# Patient Record
Sex: FEMALE | Race: OTHER
[De-identification: names, ages, dates, MRNs, and addresses within clinical notes are randomized per-mention and may not be internally consistent; named-entity substitution may affect disease eponyms.]

---

## 2018-06-22 ENCOUNTER — HOSPITAL ENCOUNTER (EMERGENCY)
Dept: HOSPITAL 8 - ED | Age: 53
Discharge: HOME | End: 2018-06-22
Payer: SELF-PAY

## 2018-06-22 VITALS — DIASTOLIC BLOOD PRESSURE: 74 MMHG | SYSTOLIC BLOOD PRESSURE: 142 MMHG

## 2018-06-22 VITALS — HEIGHT: 62 IN | WEIGHT: 142.64 LBS | BODY MASS INDEX: 26.25 KG/M2

## 2018-06-22 DIAGNOSIS — E11.9: ICD-10-CM

## 2018-06-22 DIAGNOSIS — L73.9: ICD-10-CM

## 2018-06-22 DIAGNOSIS — N76.4: Primary | ICD-10-CM

## 2018-06-22 LAB
ACETONE, SERUM: (no result) MG/DL
ANION GAP SERPL CALC-SCNC: 9 MMOL/L (ref 5–15)
BASOPHILS # BLD AUTO: 0.1 X10^3/UL (ref 0–0.1)
BASOPHILS NFR BLD AUTO: 1 % (ref 0–1)
CALCIUM SERPL-MCNC: 8.2 MG/DL (ref 8.5–10.1)
CHLORIDE SERPL-SCNC: 105 MMOL/L (ref 98–107)
CREAT SERPL-MCNC: 0.63 MG/DL (ref 0.55–1.02)
EOSINOPHIL # BLD AUTO: 0.1 X10^3/UL (ref 0–0.4)
EOSINOPHIL NFR BLD AUTO: 1 % (ref 1–7)
ERYTHROCYTE [DISTWIDTH] IN BLOOD BY AUTOMATED COUNT: 14 % (ref 9.6–15.2)
LYMPHOCYTES # BLD AUTO: 1.6 X10^3/UL (ref 1–3.4)
LYMPHOCYTES NFR BLD AUTO: 17 % (ref 22–44)
MCH RBC QN AUTO: 24.8 PG (ref 27–34.8)
MCHC RBC AUTO-ENTMCNC: 33.1 G/DL (ref 32.4–35.8)
MCV RBC AUTO: 75.1 FL (ref 80–100)
MD: NO
MONOCYTES # BLD AUTO: 0.57 X10^3/UL (ref 0.2–0.8)
MONOCYTES NFR BLD AUTO: 6 % (ref 2–9)
NEUTROPHILS # BLD AUTO: 6.87 X10^3/UL (ref 1.8–6.8)
NEUTROPHILS NFR BLD AUTO: 74 % (ref 42–75)
PH BLDV: 7.41 PH (ref 7.32–7.42)
PLATELET # BLD AUTO: 413 X10^3/UL (ref 130–400)
PMV BLD AUTO: 8.9 FL (ref 7.4–10.4)
RBC # BLD AUTO: 4.7 X10^6/UL (ref 3.82–5.3)

## 2018-06-22 PROCEDURE — 82010 KETONE BODYS QUAN: CPT

## 2018-06-22 PROCEDURE — 82962 GLUCOSE BLOOD TEST: CPT

## 2018-06-22 PROCEDURE — 82803 BLOOD GASES ANY COMBINATION: CPT

## 2018-06-22 PROCEDURE — 99284 EMERGENCY DEPT VISIT MOD MDM: CPT

## 2018-06-22 PROCEDURE — 56405 I&D VULVA/PERINEAL ABSCESS: CPT

## 2018-06-22 PROCEDURE — 85025 COMPLETE CBC W/AUTO DIFF WBC: CPT

## 2018-06-22 PROCEDURE — 36415 COLL VENOUS BLD VENIPUNCTURE: CPT

## 2018-06-22 PROCEDURE — 80048 BASIC METABOLIC PNL TOTAL CA: CPT

## 2018-06-24 ENCOUNTER — HOSPITAL ENCOUNTER (EMERGENCY)
Dept: HOSPITAL 8 - ED | Age: 53
Discharge: HOME | End: 2018-06-24
Payer: SELF-PAY

## 2018-06-24 VITALS — DIASTOLIC BLOOD PRESSURE: 73 MMHG | SYSTOLIC BLOOD PRESSURE: 110 MMHG

## 2018-06-24 VITALS — BODY MASS INDEX: 25.84 KG/M2 | WEIGHT: 140.43 LBS | HEIGHT: 62 IN

## 2018-06-24 DIAGNOSIS — L02.611: Primary | ICD-10-CM

## 2018-06-24 PROCEDURE — 99281 EMR DPT VST MAYX REQ PHY/QHP: CPT

## 2018-12-11 ENCOUNTER — HOSPITAL ENCOUNTER (OUTPATIENT)
Dept: RADIOLOGY | Facility: MEDICAL CENTER | Age: 53
End: 2018-12-11
Attending: PHYSICIAN ASSISTANT

## 2018-12-11 DIAGNOSIS — N93.8 DUB (DYSFUNCTIONAL UTERINE BLEEDING): ICD-10-CM

## 2018-12-20 ENCOUNTER — TELEPHONE (OUTPATIENT)
Dept: SCHEDULING | Facility: IMAGING CENTER | Age: 53
End: 2018-12-20

## 2019-01-21 ENCOUNTER — TELEPHONE (OUTPATIENT)
Dept: OBGYN | Facility: CLINIC | Age: 54
End: 2019-01-21

## 2019-01-21 NOTE — TELEPHONE ENCOUNTER
Pt called to verify her appt.  .  Unable to contact pt, msg left to call back.  msg left w/appt info.

## 2019-01-22 ENCOUNTER — GYNECOLOGY VISIT (OUTPATIENT)
Dept: OBGYN | Facility: CLINIC | Age: 54
End: 2019-01-22
Payer: COMMERCIAL

## 2019-01-22 VITALS — DIASTOLIC BLOOD PRESSURE: 60 MMHG | BODY MASS INDEX: 27.67 KG/M2 | WEIGHT: 137 LBS | SYSTOLIC BLOOD PRESSURE: 102 MMHG

## 2019-01-22 DIAGNOSIS — N95.0 POSTMENOPAUSAL BLEEDING: ICD-10-CM

## 2019-01-22 DIAGNOSIS — N92.1 MENOMETRORRHAGIA: ICD-10-CM

## 2019-01-22 PROCEDURE — 99203 OFFICE O/P NEW LOW 30 MIN: CPT | Performed by: OBSTETRICS & GYNECOLOGY

## 2019-01-22 NOTE — PROGRESS NOTES
Chief complaint; new patient referred from [facility]    Linda Diop is a 53 y.o.  AB 1 BTL for birth control.  Who presents complaining of heavy irregular menstrual cycles lasting 22-34 days she has not gone through menopause.  Ultrasound ordered from [facility] shows probably small fibroids measuring less than 2 cm in diameter otherwise normal.  See results in results section  Patient states she did have an abnormal Pap smear and colposcopy was performed but no records are available..    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 at [facility]   • LARYNGOSCOPY  9/3/2013    Performed by Ronnie Barreto M.D. at [facility]   • VOCAL CORD POLYP EXCISION  9/3/2013    Performed by Ronnie Barreto M.D. at [facility]   • TUBAL LIGATION       Allergies-Patient has no known allergies.  Medications-  Current Outpatient Prescriptions on File Prior to Visit   Medication Sig Dispense Refill   • metformin (GLUCOPHAGE) 500 MG TABS Take 500 mg by mouth 2 times a day, with meals. Indications: Type 2 Diabetes       No current facility-administered medications on file prior to visit.      Social history-  Social History     Social History   • Marital status: Legally      Spouse name: N/A   • Number of children: N/A   • Years of education: N/A     Occupational History   • Not on file.     Social History Main Topics   • Smoking status: Never Smoker   • Smokeless tobacco: Never Used   • Alcohol use No      Comment: occ   • Drug use: No   • Sexual activity: Yes     Partners: Male      Other Topics Concern   • Not on file     Social History Narrative   • No narrative on file     Past Family History-no history of breast or ovarian cancer    Physical examination;  Alert and oriented x3  General a thin well-developed well-nourished female in no apparent distress  Vitals:    01/22/19 1448   BP: 102/60   Weight: 62.1 kg (137 lb)     Skin is warm and dry  Neck-supple  HEENT-head-atraumatic, normocephalic, EOMI, PERRLA  Cardiovascular-regular rate and rhythm, normal S1-S2, no murmurs or gallops  Lungs-clear to auscultation bilaterally  Back-negative CVA tenderness  Abdomen-nondistended positive bowel sounds soft nontender no masses or hepatosplenomegaly  Pelvic examination;  External genitalia-no visible lesions   Vagina-no blood or discharge  Cervix-no gross lesions-stenotic cervical os  Uterus-normal size and shape, nontender  Adnexa without mass or tenderness  Extremities without cyanosis clubbing or edema  Neurologic exam grossly intact    Impression;  Menometrorrhagia  Postmenopausal bleeding    Plan;  Needs endometrial biopsy and referral placed  Patient needs to sign consent form for medical records release from [facility] to see if colposcopy was performed and results.  Will need to dilate cervix to perform endometrial biopsy.  Discussed various etiologies for postmenopausal bleeding including uterine fibroids endometrial hyperplasia or endometrial carcinoma.        30  Minutes spent with the patient in face-to-face contact, greater than 50% of the time spent on counseling and coordination of care. All questions answered in detail.

## 2019-01-22 NOTE — NON-PROVIDER
Pt is here for a uterine mass.  She was diagnosed at the [facility].  Last pap 12/18  And was abnormal

## 2019-03-06 ENCOUNTER — TELEPHONE (OUTPATIENT)
Dept: OBGYN | Facility: CLINIC | Age: 54
End: 2019-03-06

## 2019-03-06 NOTE — TELEPHONE ENCOUNTER
Pt's daughter called asking to get information on procedure and the reason for it. Adv Trotter they need to do that procedure to rule out any abnormalities and procedure explained to Trotter., she voiced understanding and had no further questions or concerns

## 2022-02-25 ENCOUNTER — APPOINTMENT (OUTPATIENT)
Dept: RADIOLOGY | Facility: MEDICAL CENTER | Age: 57
End: 2022-02-25
Attending: EMERGENCY MEDICINE

## 2022-02-25 ENCOUNTER — HOSPITAL ENCOUNTER (EMERGENCY)
Facility: MEDICAL CENTER | Age: 57
End: 2022-02-25
Attending: EMERGENCY MEDICINE

## 2022-02-25 VITALS
WEIGHT: 133.16 LBS | SYSTOLIC BLOOD PRESSURE: 136 MMHG | HEIGHT: 58 IN | HEART RATE: 88 BPM | OXYGEN SATURATION: 97 % | TEMPERATURE: 98.4 F | BODY MASS INDEX: 27.95 KG/M2 | DIASTOLIC BLOOD PRESSURE: 71 MMHG | RESPIRATION RATE: 20 BRPM

## 2022-02-25 DIAGNOSIS — R42 LIGHTHEADEDNESS: ICD-10-CM

## 2022-02-25 DIAGNOSIS — R06.09 DOE (DYSPNEA ON EXERTION): ICD-10-CM

## 2022-02-25 DIAGNOSIS — J06.9 UPPER RESPIRATORY TRACT INFECTION, UNSPECIFIED TYPE: ICD-10-CM

## 2022-02-25 DIAGNOSIS — R07.9 CHEST PAIN, UNSPECIFIED TYPE: ICD-10-CM

## 2022-02-25 LAB
ALBUMIN SERPL BCP-MCNC: 4 G/DL (ref 3.2–4.9)
ALBUMIN/GLOB SERPL: 1.3 G/DL
ALP SERPL-CCNC: 84 U/L (ref 30–99)
ALT SERPL-CCNC: 23 U/L (ref 2–50)
ANION GAP SERPL CALC-SCNC: 14 MMOL/L (ref 7–16)
AST SERPL-CCNC: 22 U/L (ref 12–45)
BASOPHILS # BLD AUTO: 1.2 % (ref 0–1.8)
BASOPHILS # BLD: 0.07 K/UL (ref 0–0.12)
BILIRUB SERPL-MCNC: 0.4 MG/DL (ref 0.1–1.5)
BUN SERPL-MCNC: 10 MG/DL (ref 8–22)
CALCIUM SERPL-MCNC: 8.8 MG/DL (ref 8.5–10.5)
CHLORIDE SERPL-SCNC: 100 MMOL/L (ref 96–112)
CO2 SERPL-SCNC: 23 MMOL/L (ref 20–33)
CREAT SERPL-MCNC: 0.34 MG/DL (ref 0.5–1.4)
EKG IMPRESSION: NORMAL
EOSINOPHIL # BLD AUTO: 0.59 K/UL (ref 0–0.51)
EOSINOPHIL NFR BLD: 10.1 % (ref 0–6.9)
ERYTHROCYTE [DISTWIDTH] IN BLOOD BY AUTOMATED COUNT: 40.4 FL (ref 35.9–50)
GLOBULIN SER CALC-MCNC: 3.1 G/DL (ref 1.9–3.5)
GLUCOSE SERPL-MCNC: 321 MG/DL (ref 65–99)
HCT VFR BLD AUTO: 41.9 % (ref 37–47)
HGB BLD-MCNC: 14.4 G/DL (ref 12–16)
IMM GRANULOCYTES # BLD AUTO: 0.03 K/UL (ref 0–0.11)
IMM GRANULOCYTES NFR BLD AUTO: 0.5 % (ref 0–0.9)
LYMPHOCYTES # BLD AUTO: 1.4 K/UL (ref 1–4.8)
LYMPHOCYTES NFR BLD: 24.1 % (ref 22–41)
MCH RBC QN AUTO: 29.3 PG (ref 27–33)
MCHC RBC AUTO-ENTMCNC: 34.4 G/DL (ref 33.6–35)
MCV RBC AUTO: 85.2 FL (ref 81.4–97.8)
MONOCYTES # BLD AUTO: 0.71 K/UL (ref 0–0.85)
MONOCYTES NFR BLD AUTO: 12.2 % (ref 0–13.4)
NEUTROPHILS # BLD AUTO: 3.02 K/UL (ref 2–7.15)
NEUTROPHILS NFR BLD: 51.9 % (ref 44–72)
NRBC # BLD AUTO: 0 K/UL
NRBC BLD-RTO: 0 /100 WBC
NT-PROBNP SERPL IA-MCNC: 49 PG/ML (ref 0–125)
PLATELET # BLD AUTO: 286 K/UL (ref 164–446)
PMV BLD AUTO: 10.7 FL (ref 9–12.9)
POTASSIUM SERPL-SCNC: 4.1 MMOL/L (ref 3.6–5.5)
PROT SERPL-MCNC: 7.1 G/DL (ref 6–8.2)
RBC # BLD AUTO: 4.92 M/UL (ref 4.2–5.4)
SARS-COV-2 RNA RESP QL NAA+PROBE: NOTDETECTED
SODIUM SERPL-SCNC: 137 MMOL/L (ref 135–145)
SPECIMEN SOURCE: NORMAL
TROPONIN T SERPL-MCNC: 8 NG/L (ref 6–19)
WBC # BLD AUTO: 5.8 K/UL (ref 4.8–10.8)

## 2022-02-25 PROCEDURE — 84484 ASSAY OF TROPONIN QUANT: CPT

## 2022-02-25 PROCEDURE — 85025 COMPLETE CBC W/AUTO DIFF WBC: CPT

## 2022-02-25 PROCEDURE — 94760 N-INVAS EAR/PLS OXIMETRY 1: CPT

## 2022-02-25 PROCEDURE — 99285 EMERGENCY DEPT VISIT HI MDM: CPT

## 2022-02-25 PROCEDURE — 96374 THER/PROPH/DIAG INJ IV PUSH: CPT

## 2022-02-25 PROCEDURE — 80053 COMPREHEN METABOLIC PANEL: CPT

## 2022-02-25 PROCEDURE — U0003 INFECTIOUS AGENT DETECTION BY NUCLEIC ACID (DNA OR RNA); SEVERE ACUTE RESPIRATORY SYNDROME CORONAVIRUS 2 (SARS-COV-2) (CORONAVIRUS DISEASE [COVID-19]), AMPLIFIED PROBE TECHNIQUE, MAKING USE OF HIGH THROUGHPUT TECHNOLOGIES AS DESCRIBED BY CMS-2020-01-R: HCPCS

## 2022-02-25 PROCEDURE — 71045 X-RAY EXAM CHEST 1 VIEW: CPT

## 2022-02-25 PROCEDURE — 83880 ASSAY OF NATRIURETIC PEPTIDE: CPT

## 2022-02-25 PROCEDURE — 93005 ELECTROCARDIOGRAM TRACING: CPT

## 2022-02-25 PROCEDURE — 36415 COLL VENOUS BLD VENIPUNCTURE: CPT

## 2022-02-25 PROCEDURE — U0005 INFEC AGEN DETEC AMPLI PROBE: HCPCS

## 2022-02-25 PROCEDURE — 700111 HCHG RX REV CODE 636 W/ 250 OVERRIDE (IP): Performed by: EMERGENCY MEDICINE

## 2022-02-25 PROCEDURE — 93005 ELECTROCARDIOGRAM TRACING: CPT | Performed by: EMERGENCY MEDICINE

## 2022-02-25 RX ORDER — KETOROLAC TROMETHAMINE 30 MG/ML
30 INJECTION, SOLUTION INTRAMUSCULAR; INTRAVENOUS ONCE
Status: COMPLETED | OUTPATIENT
Start: 2022-02-25 | End: 2022-02-25

## 2022-02-25 RX ADMIN — KETOROLAC TROMETHAMINE 30 MG: 30 INJECTION, SOLUTION INTRAMUSCULAR at 11:51

## 2022-02-25 NOTE — ED PROVIDER NOTES
"ED Provider Note    Scribed for Aftab Walters M.D. by Rigo Rivas. 2/25/2022  10:03 AM    Primary care provider: Pcp Pt States None  Means of arrival: Walk in   History obtained from: Patient   History limited by: None     CHIEF COMPLAINT  Chief Complaint   Patient presents with    Dizziness     Dizziness that worsens with ambulation and walking up the stairs    Chest Pain     L sided Cpx3 days that is a \"constant heavy weight\"       HPI  Linda Diop is a 56 y.o. female who presents to the Emergency Department for evaluation of worsening constant left sided chest pain onset 3 days ago. Patient reports a few days prior to having chest pains, she has URI symptoms including cough, congestion, and rhinorrhea. Shortly after those symptoms began, she started having left sided chest pain which was non-radiating and pressure like in sensation. She denies exacerbation of chest pain with ambulation or exertion. She states that this is the first time she has been having such symptoms. She presents associated symptoms of dizziness that worsens with exertion, generalized weakness, bilateral leg pain, shortness of breath with exertion, and numbness to bilateral legs. She adds that her right leg hurts more than her left. Denies numbness or tingling to her arms, edema in bilateral lower extremities, nausea, emesis, diarrhea, fever, loss of appetite, decrease in PO intake, or chills. Patient reports that when she ambulates, her leg pain feels better, but adds that her leg pain worsens at night. She has not taken Tylenol or Ibuprofen at home for alleviation. Patient currently lives with her son at home. Patient is vaccinated for COVID x2, but states that she was diagnosed with COVID in January 2022. She denies smoking or drinking alcohol. She denies history of MI, Hypertension, DVT or PE. Patient is not on any hormones. Patient has additional history of Diabetes and is compliant with her Metformin dosages. " "    REVIEW OF SYSTEMS  Pertinent positives include resolved cough, resolved rhinorrhea, resolved congestion, dizziness, generalized weakness, left sided chest pain, bilateral leg pain, shortness of breath, and numbness to bilateral legs.. Pertinent negatives include no edema in bilateral lower extremities numbness or tingling to her arms, nausea, emesis, diarrhea, fever, loss of appetite, decrease in PO intake or chills.  All other systems reviewed and negative.    PAST MEDICAL HISTORY   has a past medical history of Cancer (HCC) (9/2013) and DIABETES MELLITUS (2010).    SURGICAL HISTORY   has a past surgical history that includes tubal ligation (2001); laryngoscopy (9/3/2013); vocal cord polyp excision (9/3/2013); and pterygium excision (12/10/2013).    SOCIAL HISTORY  Social History     Tobacco Use    Smoking status: Never Smoker    Smokeless tobacco: Never Used   Substance Use Topics    Alcohol use: No     Comment: occ    Drug use: No      Social History     Substance and Sexual Activity   Drug Use No       FAMILY HISTORY  Family History   Problem Relation Age of Onset    Diabetes Mother     Hypertension Mother     Heart Disease Mother     Diabetes Father     Heart Disease Father     Diabetes Sister     Diabetes Brother        CURRENT MEDICATIONS  Current Outpatient Medications   Medication Instructions    metFORMIN (GLUCOPHAGE) 500 mg, 2 TIMES DAILY WITH MEALS        ALLERGIES  No Known Allergies    PHYSICAL EXAM  VITAL SIGNS: /83   Pulse 92   Temp 36.3 °C (97.3 °F) (Temporal)   Resp 16   Ht 1.473 m (4' 10\")   Wt 60.4 kg (133 lb 2.5 oz)   SpO2 98%   BMI 27.83 kg/m²     Constitutional: Well developed, Well nourished, no acute distress, Non-toxic appearance.   HENT: Normocephalic, Atraumatic, Bilateral external ears normal, Oropharynx moist, No oral exudates.   Eyes: PERRLA, EOMI, Conjunctiva normal, No discharge.   Neck: No tenderness, Supple, No stridor.   Lymphatic: No lymphadenopathy noted. "   Cardiovascular: Normal heart rate, Normal rhythm.   Thorax & Lungs: Clear to auscultation bilaterally, No respiratory distress, No wheezing, No crackles. No positional chest pain with deep breaths   Abdomen: Soft, No tenderness, No masses, No pulsatile masses.   Skin: Warm, Dry, No erythema, No rash.   Extremities:, No edema No cyanosis. 1+ dorsalis pedis pulses. Bilateral feet are cold  Musculoskeletal: No tenderness to palpation or major deformities noted.  Intact distal pulses  Neurologic: Awake, alert. Cranial nerves 2-11 intact, muscle strength 5/5 in bilateral upper and lower extremities   Psychiatric: Affect normal, Judgment normal, Mood normal.     LABS  Results for orders placed or performed during the hospital encounter of 02/25/22   CBC with Differential   Result Value Ref Range    WBC 5.8 4.8 - 10.8 K/uL    RBC 4.92 4.20 - 5.40 M/uL    Hemoglobin 14.4 12.0 - 16.0 g/dL    Hematocrit 41.9 37.0 - 47.0 %    MCV 85.2 81.4 - 97.8 fL    MCH 29.3 27.0 - 33.0 pg    MCHC 34.4 33.6 - 35.0 g/dL    RDW 40.4 35.9 - 50.0 fL    Platelet Count 286 164 - 446 K/uL    MPV 10.7 9.0 - 12.9 fL    Neutrophils-Polys 51.90 44.00 - 72.00 %    Lymphocytes 24.10 22.00 - 41.00 %    Monocytes 12.20 0.00 - 13.40 %    Eosinophils 10.10 (H) 0.00 - 6.90 %    Basophils 1.20 0.00 - 1.80 %    Immature Granulocytes 0.50 0.00 - 0.90 %    Nucleated RBC 0.00 /100 WBC    Neutrophils (Absolute) 3.02 2.00 - 7.15 K/uL    Lymphs (Absolute) 1.40 1.00 - 4.80 K/uL    Monos (Absolute) 0.71 0.00 - 0.85 K/uL    Eos (Absolute) 0.59 (H) 0.00 - 0.51 K/uL    Baso (Absolute) 0.07 0.00 - 0.12 K/uL    Immature Granulocytes (abs) 0.03 0.00 - 0.11 K/uL    NRBC (Absolute) 0.00 K/uL   Complete Metabolic Panel (CMP)   Result Value Ref Range    Sodium 137 135 - 145 mmol/L    Potassium 4.1 3.6 - 5.5 mmol/L    Chloride 100 96 - 112 mmol/L    Co2 23 20 - 33 mmol/L    Anion Gap 14.0 7.0 - 16.0    Glucose 321 (H) 65 - 99 mg/dL    Bun 10 8 - 22 mg/dL    Creatinine 0.34 (L)  0.50 - 1.40 mg/dL    Calcium 8.8 8.5 - 10.5 mg/dL    AST(SGOT) 22 12 - 45 U/L    ALT(SGPT) 23 2 - 50 U/L    Alkaline Phosphatase 84 30 - 99 U/L    Total Bilirubin 0.4 0.1 - 1.5 mg/dL    Albumin 4.0 3.2 - 4.9 g/dL    Total Protein 7.1 6.0 - 8.2 g/dL    Globulin 3.1 1.9 - 3.5 g/dL    A-G Ratio 1.3 g/dL   Troponin STAT   Result Value Ref Range    Troponin T 8 6 - 19 ng/L   proBrain Natriuretic Peptide, NT   Result Value Ref Range    NT-proBNP 49 0 - 125 pg/mL   SARS-CoV-2, PCR (24 Hour In-House) Collect NP swab in VTM    Specimen: Respirate   Result Value Ref Range    SARS-CoV-2 Source NP Swab     SARS-CoV-2 by PCR NotDetected    ESTIMATED GFR   Result Value Ref Range    GFR If African American >60 >60 mL/min/1.73 m 2    GFR If Non African American >60 >60 mL/min/1.73 m 2   EKG (NOW)   Result Value Ref Range    Report       [facility] Emergency Dept.    Test Date:  2022  Pt Name:    DIMAS ESPANA             Department: ER  MRN:        [id number]                      Room:  Gender:     Female                       Technician: [id number]  :        [id number]                   Requested By:ER TRIAGE PROTOCOL  Order #:    [id number]                    Reading MD: GÉNESIS HARRISON MD    Measurements  Intervals                                Axis  Rate:       88                           P:          66  DE:         164                          QRS:        37  QRSD:       86                           T:          10  QT:         392  QTc:        475    Interpretive Statements  SINUS RHYTHM  BORDERLINE LOW VOLTAGE IN FRONTAL LEADS  BORDERLINE R WAVE PROGRESSION, ANTERIOR LEADS  No previous ECG available for comparison  Electronically Signed On 2022 10:21:09 PST by GÉNESIS HARRISON MD       All labs reviewed by me.  12 lead EKG interpreted by me as shown above.     RADIOLOGY  DX-CHEST-PORTABLE (1 VIEW)   Final Result      No evidence of acute cardiopulmonary process.        The radiologist's  "interpretation of all radiological studies have been reviewed by me.      COURSE & MEDICAL DECISION MAKING  Pertinent Labs & Imaging studies reviewed. (See chart for details)      10:03 AM  Patient evaluated at bedside. Ordered DX-chest, COV 2, CBC with diff., CMP, BNP, troponin, and EKG for further evaluation.  Patient will be treated with Toradol 30 mg. Differential diagnoses include but not limited to: ACS, PE, Diabetic Neuropathy, Long COVID, or URI. Discussed with patient about administering basic labs and imaging for further evaluation.  Informed patient about medication administration for pain control. Patient verbalizes understanding and agreement to this plan of care.     11:46 AM Patient was reevaluated at bedside. Discussed lab and radiology results with the patient and informed them that there is no evidence of heart attack, infection or pnemonia. I discussed with patient care of plan following discharge. Patient was given opportunity to ask questions at this time. Counseled patient on proper OTC medication dosages for pain control and relief. Patient verbalizes understanding and agrees to care of plan.  Patient will be discharged at this time. Patient will be discharged with a referral to Cardiology. Her vital signs prior to discharge: BP (!) 179/84   Pulse 86   Temp 36.3 °C (97.3 °F) (Temporal)   Resp 16   Ht 1.473 m (4' 10\")   Wt 60.4 kg (133 lb 2.5 oz)   LMP 12/08/2018   SpO2 97%   BMI 27.83 kg/m²     Decision Making:  Patient is coming in with left-sided chest chest pressure after URI type symptoms, the patient is also having dyspnea on exertion and lightheadedness.  Work-up here is unremarkable, the patient has minimal risk factors for ACS, heart score is 2.  I believe the patient can follow-up as an outpatient, have the patient return with worsening symptoms, Tylenol ibuprofen for her symptoms.    The patient will return for new or worsening symptoms and is stable at the time of " discharge.    The patient is referred to a primary physician for blood pressure management, diabetic screening, and for all other preventative health concerns.    DISPOSITION:  Patient will be discharged home in stable condition.    FOLLOW UP:  [facility], Emergency Dept  [address]  [facility] [address] [phone]    If symptoms worsen    [facility]  [address], [address]  [facility] [address] [phone]      FINAL IMPRESSION  1. Upper respiratory tract infection, unspecified type    2. Chest pain, unspecified type    3. KATHLEEN (dyspnea on exertion)    4. Lightheadedness        Rigo GRAY (Scribe), am scribing for, and in the presence of, Aftab Walters M.D..    Electronically signed by: Rigo Rivas (Scribe), 2/25/2022    IAftab M.D. personally performed the services described in this documentation, as scribed by Rigo Rivas in my presence, and it is both accurate and complete.    C    The note accurately reflects work and decisions made by me.  Aftab Walters M.D.  2/25/2022  5:47 PM

## 2022-02-25 NOTE — ED NOTES
Pt given d/c instructions and f/u info with verbal understanding.  VSS at discharge.  PIV d/c'd with tip intact.  Pt ambulatory from the ED w/ steady gait accompanied by family member.  All belongings in possession on discharge.  Pt escorted to the lobby by RN.

## 2022-02-25 NOTE — ED NOTES
Pt ambulated with a normal, steady gait to the room from the [facility]. Pt asked to change into a gown and placed on a cardiac monitor, pulse ox, and bp cuff.

## 2022-02-25 NOTE — ED NOTES
Med rec completed per patient at bedside.  Allergies reviewed with patient. NKDA.  Per patient, no vitamins/supplements and no recent over-the-counter medications.  No outpatient antibiotics in the last 30 days.  Patient's preferred pharmacy: [facility].

## 2022-02-25 NOTE — ED TRIAGE NOTES
"Chief Complaint   Patient presents with   • Dizziness     Dizziness that worsens with ambulation and walking up the stairs   • Chest Pain     L sided Cpx3 days that is a \"constant heavy weight\"       Pt walk in w/ her son. Pt aox4, speaking full sentences. No SOB. EKG obtained and protocol ordered.     /83   Pulse 92   Temp 36.3 °C (97.3 °F) (Temporal)   Resp 16   Ht 1.473 m (4' 10\")   Wt 60.4 kg (133 lb 2.5 oz)   SpO2 98%   BMI 27.83 kg/m²     "

## 2022-02-26 NOTE — RESULT ENCOUNTER NOTE
Patient presents with L sided flank pain since 0430. +nasuea and dizziness. Denies any urinary symptoms.    Released to [facility] With instructions

## 2023-12-11 ENCOUNTER — HOSPITAL ENCOUNTER (EMERGENCY)
Facility: MEDICAL CENTER | Age: 58
End: 2023-12-11
Attending: STUDENT IN AN ORGANIZED HEALTH CARE EDUCATION/TRAINING PROGRAM

## 2023-12-11 VITALS
WEIGHT: 133.16 LBS | DIASTOLIC BLOOD PRESSURE: 76 MMHG | TEMPERATURE: 97.3 F | OXYGEN SATURATION: 99 % | HEART RATE: 108 BPM | RESPIRATION RATE: 14 BRPM | SYSTOLIC BLOOD PRESSURE: 146 MMHG | BODY MASS INDEX: 27.83 KG/M2

## 2023-12-11 DIAGNOSIS — Z51.89 ENCOUNTER FOR WOUND RE-CHECK: ICD-10-CM

## 2023-12-11 PROCEDURE — 99282 EMERGENCY DEPT VISIT SF MDM: CPT

## 2023-12-11 ASSESSMENT — FIBROSIS 4 INDEX: FIB4 SCORE: 0.9

## 2023-12-12 NOTE — DISCHARGE INSTRUCTIONS
Take all the antibiotics until they are gone if you develop any increasing redness around your labia's, or spreading into your groin, increasing purulent drainage, symptoms do not improve in the next 48 hours return for recheck please take all antibiotics until they are gone and return with other concerns

## 2023-12-12 NOTE — ED TRIAGE NOTES
Chief Complaint   Patient presents with    Abscess     Patient has an abscess in right groin         Patient educated on triage process. Informed to notified ED staff of change in symptoms.     Vitals:    12/11/23 1919   BP: (!) 150/76   Pulse: (!) 109   Resp: 16   Temp: 36.1 °C (97 °F)   SpO2: 98%

## 2023-12-12 NOTE — ED PROVIDER NOTES
CHIEF COMPLAINT  Chief Complaint   Patient presents with    Abscess     Patient has an abscess in right groin        LIMITATION TO HISTORY   Select: None    HPI    Linda Diop is a 58 y.o. female who presents to the Emergency Department evaluation of an abscess in her right groin.  Patient stated that approximately 5 days ago she developed painful area of swelling, was seen by her PCP, who performed an incision and drainage and prescribed her doxycycline.  She did not start taking the doxycycline, states that since having the I&D performed she has had significant improvements in the area of induration and swelling though given the wound had not resolved she Beltran presented to the ER.  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    SURGICAL HISTORY  Past Surgical History:   Procedure Laterality Date    PTERYGIUM EXCISION  12/10/2013    Performed by Arnaud Suarez M.D. at SURGERY SAME DAY [facility] ORS    LARYNGOSCOPY  9/3/2013    Performed by Ronnie Barreto M.D. at SURGERY SAME DAY [facility] ORS    VOCAL CORD POLYP EXCISION  9/3/2013    Performed by Ronnie Barreto M.D. at SURGERY SAME DAY [facility] ORS    TUBAL LIGATION  2001         FAMILY HISTORY  Family History   Problem Relation Age of Onset    Diabetes Mother     Hypertension Mother     Heart Disease Mother     Diabetes Father     Heart Disease Father     Diabetes Sister     Diabetes Brother           SOCIAL HISTORY  Social History     Socioeconomic History    Marital status: Legally      Spouse name: Not on file    Number of  children: Not on file    Years of education: Not on file    Highest education level: Not on file   Occupational History    Not on file   Tobacco Use    Smoking status: Never    Smokeless tobacco: Never   Substance and Sexual Activity    Alcohol use: No     Comment: occ    Drug use: No    Sexual activity: Yes     Partners: Male   Other Topics Concern    Not on file   Social History Narrative    Not on file     Social Determinants of Health     Financial Resource Strain: Not on file   Food Insecurity: Not on file   Transportation Needs: Not on file   Physical Activity: Not on file   Stress: Not on file   Social Connections: Not on file   Intimate Partner Violence: Not on file   Housing Stability: Not on file         CURRENT MEDICATIONS  No current facility-administered medications on file prior to encounter.     Current Outpatient Medications on File Prior to Encounter   Medication Sig Dispense Refill    metformin (GLUCOPHAGE) 1000 MG tablet Take 1,000 mg by mouth 2 times a day. Indications: Type 2 Diabetes             ALLERGIES  No Known Allergies    PHYSICAL EXAM  VITAL SIGNS:BP (!) 150/76   Pulse (!) 109   Temp 36.1 °C (97 °F) (Temporal)   Resp 16   Wt 60.4 kg (133 lb 2.5 oz)   LMP 12/08/2018   SpO2 98%   BMI 27.83 kg/m²       VITALS - vital signs documented prior to this note have been reviewed and noted,  see EHR  GENERAL - awake and alert, no acute distress  HEENT - normocephalic, atraumatic, moist mucus membranes  CARDIOVASCULAR - regular rate and rhythm  PULMONARY - unlabored, no respiratory distress. No audible wheezing or  stridor.    : An external  exam was performed with female nurse chaperone RINA present in the room at all times.  She has a very small area of induration measuring approximately 1 x 0.5 cm a bedside ultrasound was performed and there was no residual abscess, there is no surrounding evidence of cellulitis, there is no erythema of the perineum, labia, no fluctuance or crepitus    NEUROLOGIC - mental status normal, speech fluid, cognition normal  MUSCULOSKELETAL -no obvious deformity or swelling  DERMATOLOGIC - warm and dry, no visible rashes  PSYCHIATRIC - normal affect, normal concentration      DIAGNOSTIC STUDIES / PROCEDURES    RADIOLOGY  I have independently interpreted the diagnostic imaging associated with this visit and am waiting the final reading from the radiologist.   My preliminary interpretation is as follows: No abscess    Radiologist interpretation:   No orders to display        COURSE & MEDICAL DECISION MAKING    ED COURSE:    ED Observation Status? no    INTERVENTIONS BY ME:  Medications - No data to display      Point of Care Ultrasound    ED POINT OF CARE ULTRASOUND: LIMITED SKIN/SOFT TISSUE, FOREIGN BODY IDENTIFICATION    Indication: Swelling and Pain  Procedure: A limited ultrasound of the patients skin and soft tissue was performed at the area of clinical concern as noted.     There was not evidence of a foreign body present.   There was evidence of cobblestoning of the soft tissues.   A localized fluid collection was not present.     Impression: Based on this limited bedside ultrasound, there was evidence of cellulitis, and a drainable fluid collection was not seen. There was not evidence of a foreign body. Further clinical interpretation or comments:     [facility]         This study is a limited ultrasound examination performed and interpreted to evaluate for limited conditions as outlined above. There may be other clinically important information contained in the images that is outside this scope. When clinically warranted, a comprehensive ultrasound through the appropriate department is considered.         @HTN/IDDM FOLLOW UP:  The patient has known hypertension and is being followed by their primary care doctor@    INITIAL ASSESSMENT, COURSE AND PLAN  Care Narrative: Patient presented for evaluation of pain and swelling near a  recent incision and drainage site in her right inguinal region.  On examination she has a 1 x 0.5 cm area of induration without surrounding erythema or fluctuance.  Patient denies any recent fevers does have an underlying history of diabetes though there is no erythema of the patient's perineum, external labia's, and no erythema surrounding the recent incision and drainage site.  In addition the patient reports that the area of swelling has significantly improved after the incision and drainage however she has not started taking the antibiotics that she was prescribed.  She just picked them up today.  Patient was prescribed doxycycline which I believe would be an appropriate antibiotic.  Was tachycardic initially in triage though this resolved without any intervention on my assessment her heart rate was 88.  A bedside ultrasound was performed, and there was no drainable fluid collection, was a small area cobblestoning near the recent I&D site may be residual inflammation.  On examination there is no evidence to suggest an underlying Annetta's, abscess or other necrotizing infection thus labs and imaging were deferred. She has not started her doxycycline, though does have it in her possession.  Recommend she start taking her doxycycline return for wound recheck if she develops any spreading erythema worsening pain purulent drainage or symptoms do not improve in the next 24 to 48 hours she did feel comfortable plan is discharged in stable condition           ADDITIONAL PROBLEM LIST    DISPOSITION AND DISCUSSIONS    Escalation of care considered, and ultimately not performed:blood analysis and diagnostic imaging    Barriers to care at this time, including but not limited    Decision tools and prescription drugs considered including, but not limited to: Antibiotics   .    FINAL DIAGNOSIS  1. Encounter for wound re-check             Electronically signed by: Yadiel West DO ,8:47 PM 12/11/23

## 2023-12-12 NOTE — ED NOTES
Discharge paperwork and teaching provided to patient regarding abscess and all questions answered. VSS, pain assessment stable. Provided information regarding home care, antibiotic prescription from PCP, follow up with PCP, and reasons to return to ED. Patient provided no Rx. Patient discharged to the care of self and ambulated with steady gait out of the ED with all belongings.

## 2023-12-12 NOTE — ED NOTES
Pt reports abscess started last Thursday as a reddened, sore are in the right groin. It became worse Friday, so she saw her PCP who performed an I&D and prescribed doxycycline. Pt did not  antibiotic Rx from pharmacy until today. Abscess has become more painful and red. Pt denies taking any medication for pain.

## 2024-08-09 ENCOUNTER — APPOINTMENT (OUTPATIENT)
Dept: RADIOLOGY | Facility: MEDICAL CENTER | Age: 59
End: 2024-08-09
Attending: EMERGENCY MEDICINE

## 2024-08-09 ENCOUNTER — HOSPITAL ENCOUNTER (EMERGENCY)
Facility: MEDICAL CENTER | Age: 59
End: 2024-08-09
Attending: EMERGENCY MEDICINE

## 2024-08-09 VITALS
DIASTOLIC BLOOD PRESSURE: 83 MMHG | SYSTOLIC BLOOD PRESSURE: 155 MMHG | TEMPERATURE: 98.8 F | WEIGHT: 114.64 LBS | BODY MASS INDEX: 23.96 KG/M2 | OXYGEN SATURATION: 98 % | HEART RATE: 110 BPM | RESPIRATION RATE: 18 BRPM

## 2024-08-09 DIAGNOSIS — B34.9 VIRAL SYNDROME: ICD-10-CM

## 2024-08-09 DIAGNOSIS — R73.9 HYPERGLYCEMIA: ICD-10-CM

## 2024-08-09 LAB
ALBUMIN SERPL BCP-MCNC: 3.2 G/DL (ref 3.2–4.9)
ALBUMIN/GLOB SERPL: 0.7 G/DL
ALP SERPL-CCNC: 147 U/L (ref 30–99)
ALT SERPL-CCNC: 20 U/L (ref 2–50)
ANION GAP SERPL CALC-SCNC: 11 MMOL/L (ref 7–16)
AST SERPL-CCNC: 9 U/L (ref 12–45)
BASOPHILS # BLD AUTO: 0.6 % (ref 0–1.8)
BASOPHILS # BLD: 0.06 K/UL (ref 0–0.12)
BILIRUB SERPL-MCNC: 0.5 MG/DL (ref 0.1–1.5)
BUN SERPL-MCNC: 40 MG/DL (ref 8–22)
CALCIUM ALBUM COR SERPL-MCNC: 9.8 MG/DL (ref 8.5–10.5)
CALCIUM SERPL-MCNC: 9.2 MG/DL (ref 8.5–10.5)
CHLORIDE SERPL-SCNC: 92 MMOL/L (ref 96–112)
CO2 SERPL-SCNC: 23 MMOL/L (ref 20–33)
CREAT SERPL-MCNC: 0.64 MG/DL (ref 0.5–1.4)
EKG IMPRESSION: NORMAL
EOSINOPHIL # BLD AUTO: 0.08 K/UL (ref 0–0.51)
EOSINOPHIL NFR BLD: 0.8 % (ref 0–6.9)
ERYTHROCYTE [DISTWIDTH] IN BLOOD BY AUTOMATED COUNT: 45.5 FL (ref 35.9–50)
FLUAV RNA SPEC QL NAA+PROBE: NEGATIVE
FLUBV RNA SPEC QL NAA+PROBE: NEGATIVE
GFR SERPLBLD CREATININE-BSD FMLA CKD-EPI: 103 ML/MIN/1.73 M 2
GLOBULIN SER CALC-MCNC: 4.8 G/DL (ref 1.9–3.5)
GLUCOSE BLD STRIP.AUTO-MCNC: 427 MG/DL (ref 65–99)
GLUCOSE BLD STRIP.AUTO-MCNC: 472 MG/DL (ref 65–99)
GLUCOSE BLD STRIP.AUTO-MCNC: 495 MG/DL (ref 65–99)
GLUCOSE SERPL-MCNC: 470 MG/DL (ref 65–99)
HCT VFR BLD AUTO: 41.2 % (ref 37–47)
HGB BLD-MCNC: 13.6 G/DL (ref 12–16)
IMM GRANULOCYTES # BLD AUTO: 0.07 K/UL (ref 0–0.11)
IMM GRANULOCYTES NFR BLD AUTO: 0.7 % (ref 0–0.9)
LYMPHOCYTES # BLD AUTO: 0.72 K/UL (ref 1–4.8)
LYMPHOCYTES NFR BLD: 6.9 % (ref 22–41)
MCH RBC QN AUTO: 27.4 PG (ref 27–33)
MCHC RBC AUTO-ENTMCNC: 33 G/DL (ref 32.2–35.5)
MCV RBC AUTO: 83.1 FL (ref 81.4–97.8)
MONOCYTES # BLD AUTO: 1 K/UL (ref 0–0.85)
MONOCYTES NFR BLD AUTO: 9.6 % (ref 0–13.4)
NEUTROPHILS # BLD AUTO: 8.46 K/UL (ref 1.82–7.42)
NEUTROPHILS NFR BLD: 81.4 % (ref 44–72)
NRBC # BLD AUTO: 0 K/UL
NRBC BLD-RTO: 0 /100 WBC (ref 0–0.2)
PLATELET # BLD AUTO: 210 K/UL (ref 164–446)
PMV BLD AUTO: 11.2 FL (ref 9–12.9)
POTASSIUM SERPL-SCNC: 4.7 MMOL/L (ref 3.6–5.5)
PROT SERPL-MCNC: 8 G/DL (ref 6–8.2)
RBC # BLD AUTO: 4.96 M/UL (ref 4.2–5.4)
RSV RNA SPEC QL NAA+PROBE: NEGATIVE
SARS-COV-2 RNA RESP QL NAA+PROBE: NOTDETECTED
SODIUM SERPL-SCNC: 126 MMOL/L (ref 135–145)
TROPONIN T SERPL-MCNC: 7 NG/L (ref 6–19)
WBC # BLD AUTO: 10.4 K/UL (ref 4.8–10.8)

## 2024-08-09 PROCEDURE — 0241U HCHG SARS-COV-2 COVID-19 NFCT DS RESP RNA 4 TRGT ED POC: CPT

## 2024-08-09 PROCEDURE — 96374 THER/PROPH/DIAG INJ IV PUSH: CPT

## 2024-08-09 PROCEDURE — 700111 HCHG RX REV CODE 636 W/ 250 OVERRIDE (IP): Performed by: EMERGENCY MEDICINE

## 2024-08-09 PROCEDURE — 82962 GLUCOSE BLOOD TEST: CPT

## 2024-08-09 PROCEDURE — 84484 ASSAY OF TROPONIN QUANT: CPT

## 2024-08-09 PROCEDURE — 80053 COMPREHEN METABOLIC PANEL: CPT

## 2024-08-09 PROCEDURE — 99284 EMERGENCY DEPT VISIT MOD MDM: CPT

## 2024-08-09 PROCEDURE — 85025 COMPLETE CBC W/AUTO DIFF WBC: CPT

## 2024-08-09 PROCEDURE — 71045 X-RAY EXAM CHEST 1 VIEW: CPT

## 2024-08-09 PROCEDURE — 93005 ELECTROCARDIOGRAM TRACING: CPT | Performed by: EMERGENCY MEDICINE

## 2024-08-09 PROCEDURE — 36415 COLL VENOUS BLD VENIPUNCTURE: CPT

## 2024-08-09 RX ADMIN — INSULIN HUMAN 5 UNITS: 100 INJECTION, SOLUTION PARENTERAL at 14:22

## 2024-08-09 NOTE — ED TRIAGE NOTES
Chief Complaint   Patient presents with    Flu Like Symptoms     PT reports body aches, R sided chest pain. nausea and congestion since Sunday.     H/O DM II and Cancer.  Covid testing and CP protocol initiated.

## 2024-08-09 NOTE — ED PROVIDER NOTES
ED Provider Note    CHIEF COMPLAINT  Chief Complaint   Patient presents with    Flu Like Symptoms     PT reports body aches, R sided chest pain. nausea and congestion since Sunday.       EXTERNAL RECORDS REVIEWED  Inpatient Notes prior ER note 12/11/2023 and the patient was here for recheck after abscess    HPI/ROS  LIMITATION TO HISTORY   Select: Language Ecuadorean,  Used   OUTSIDE HISTORIAN(S):  Family at bedside providing history and translation    Linda Diop is a 59 y.o. female who presents to the emergency department for feeling generally unwell for the last 5 to 6 days.  Diffuse body aches to include muscle and joint discomfort.  Also complaining of some chest discomfort especially under the right lower ribs.  Has had some nasal congestion and nausea.  No known sick contacts.  No recent travel.  Does have known history of diabetes with chronic hyperglycemia.  Family at bedside states that with prior attempts at more strict blood sugar control the patient had multiple recurrent lows which were ultimately deemed to be unsuitable and therefore there was additional allowance for higher averages.    PAST MEDICAL HISTORY   has a past medical history of Cancer (HCC) (9/2013) and DIABETES MELLITUS (2010).    SURGICAL HISTORY   has a past surgical history that includes tubal ligation (2001); laryngoscopy (9/3/2013); vocal cord polyp excision (9/3/2013); and pterygium excision (12/10/2013).    FAMILY HISTORY  Family History   Problem Relation Age of Onset    Diabetes Mother     Hypertension Mother     Heart Disease Mother     Diabetes Father     Heart Disease Father     Diabetes Sister     Diabetes Brother        SOCIAL HISTORY  Social History     Tobacco Use    Smoking status: Never    Smokeless tobacco: Never   Vaping Use    Vaping status: Never Used   Substance and Sexual Activity    Alcohol use: No     Comment: occ    Drug use: No    Sexual activity: Yes     Partners: Male       CURRENT  MEDICATIONS  Home Medications    **Home medications have not yet been reviewed for this encounter**         ALLERGIES  No Known Allergies    PHYSICAL EXAM  VITAL SIGNS: BP (!) 155/83   Pulse (!) 110   Temp 37.1 °C (98.8 °F) (Temporal)   Resp 18   Wt 52 kg (114 lb 10.2 oz)   LMP 12/08/2018   SpO2 98%   BMI 23.96 kg/m²      Pulse ox interpretation: I interpret this pulse ox as normal.  Constitutional: Alert in no apparent distress.  HENT: No signs of trauma, Bilateral external ears normal, Nose normal.   Eyes: Pupils are equal and reactive  Neck: Normal range of motion, No tenderness, Supple  Cardiovascular: Regular rate and rhythm, no murmurs.   Thorax & Lungs: Normal breath sounds, No respiratory distress, No wheezing, No chest tenderness.   Abdomen: Bowel sounds normal, Soft, No tenderness  Skin: Warm, Dry, No erythema, No rash.  Musculoskeletal: Good range of motion in all major joints. No tenderness to palpation or major deformities noted.   Neurologic: Alert , Normal motor function, Normal sensory function, No focal deficits noted.   Psychiatric: Affect normal, Judgment normal, Mood normal.         EKG/LABS  Results for orders placed or performed during the hospital encounter of 08/09/24   CBC with Differential   Result Value Ref Range    WBC 10.4 4.8 - 10.8 K/uL    RBC 4.96 4.20 - 5.40 M/uL    Hemoglobin 13.6 12.0 - 16.0 g/dL    Hematocrit 41.2 37.0 - 47.0 %    MCV 83.1 81.4 - 97.8 fL    MCH 27.4 27.0 - 33.0 pg    MCHC 33.0 32.2 - 35.5 g/dL    RDW 45.5 35.9 - 50.0 fL    Platelet Count 210 164 - 446 K/uL    MPV 11.2 9.0 - 12.9 fL    Neutrophils-Polys 81.40 (H) 44.00 - 72.00 %    Lymphocytes 6.90 (L) 22.00 - 41.00 %    Monocytes 9.60 0.00 - 13.40 %    Eosinophils 0.80 0.00 - 6.90 %    Basophils 0.60 0.00 - 1.80 %    Immature Granulocytes 0.70 0.00 - 0.90 %    Nucleated RBC 0.00 0.00 - 0.20 /100 WBC    Neutrophils (Absolute) 8.46 (H) 1.82 - 7.42 K/uL    Lymphs (Absolute) 0.72 (L) 1.00 - 4.80 K/uL    Monos  (Absolute) 1.00 (H) 0.00 - 0.85 K/uL    Eos (Absolute) 0.08 0.00 - 0.51 K/uL    Baso (Absolute) 0.06 0.00 - 0.12 K/uL    Immature Granulocytes (abs) 0.07 0.00 - 0.11 K/uL    NRBC (Absolute) 0.00 K/uL   Complete Metabolic Panel (CMP)   Result Value Ref Range    Sodium 126 (L) 135 - 145 mmol/L    Potassium 4.7 3.6 - 5.5 mmol/L    Chloride 92 (L) 96 - 112 mmol/L    Co2 23 20 - 33 mmol/L    Anion Gap 11.0 7.0 - 16.0    Glucose 470 (H) 65 - 99 mg/dL    Bun 40 (H) 8 - 22 mg/dL    Creatinine 0.64 0.50 - 1.40 mg/dL    Calcium 9.2 8.5 - 10.5 mg/dL    Correct Calcium 9.8 8.5 - 10.5 mg/dL    AST(SGOT) 9 (L) 12 - 45 U/L    ALT(SGPT) 20 2 - 50 U/L    Alkaline Phosphatase 147 (H) 30 - 99 U/L    Total Bilirubin 0.5 0.1 - 1.5 mg/dL    Albumin 3.2 3.2 - 4.9 g/dL    Total Protein 8.0 6.0 - 8.2 g/dL    Globulin 4.8 (H) 1.9 - 3.5 g/dL    A-G Ratio 0.7 g/dL   Troponins NOW   Result Value Ref Range    Troponin T 7 6 - 19 ng/L   ESTIMATED GFR   Result Value Ref Range    GFR (CKD-EPI) 103 >60 mL/min/1.73 m 2   EKG   Result Value Ref Range    Report       [facility] Emergency Dept.    Test Date:  2024  Pt Name:    DIMAS ESPANA             Department: ER  MRN:        [id number]                      Room:  Gender:     Female                       Technician: [id number]  :        1965                   Requested By:ER TRIAGE PROTOCOL  Order #:    [id number]                    Rosalie MD:    Measurements  Intervals                                Axis  Rate:       107                          P:          62  LA:         143                          QRS:        27  QRSD:       89                           T:          22  QT:         341  QTc:        455    Interpretive Statements  Sinus tachycardia  Anterior infarct, old  Compared to ECG 2022 09:40:09  Myocardial infarct finding now present  Sinus rhythm no longer present     POCT glucose device results   Result Value Ref Range    POC Glucose, Blood 427 (HH) 65  - 99 mg/dL   POC CoV-2, FLU A/B, RSV by PCR   Result Value Ref Range    POC Influenza A RNA, PCR Negative Negative    POC Influenza B RNA, PCR Negative Negative    POC RSV, by PCR Negative Negative    POC SARS-CoV-2, PCR NotDetected NotDetected   POCT glucose device results   Result Value Ref Range    POC Glucose, Blood 495 (HH) 65 - 99 mg/dL   POCT glucose device results   Result Value Ref Range    POC Glucose, Blood 472 (HH) 65 - 99 mg/dL       I have independently interpreted this EKG    RADIOLOGY/PROCEDURES   I have independently interpreted the diagnostic imaging associated with this visit and am waiting the final reading from the radiologist.   My preliminary interpretation is as follows: No consolidative process    Radiologist interpretation:  DX-CHEST-PORTABLE (1 VIEW)   Final Result      No acute cardiopulmonary disease evident.          COURSE & MEDICAL DECISION MAKING    ASSESSMENT, COURSE AND PLAN  Care Narrative: 59-year-old presenting to the emerged from with above presentation.  Given myalgias and arthralgias high suspicion for potential viral syndrome.  However given chest and upper abdominal discomfort there is a broader differential that has been considered.  Will get labs, EKG, chest x-ray and viral panel to start    Given hyperglycemia from triage will initiate insulin as well    DISPOSITION AND DISCUSSIONS  I have discussed management of the patient with the following physicians and SHUBHAM's: None    Discussion of management with other QHP or appropriate source(s): None     Escalation of care considered, and ultimately not performed:acute inpatient care management, however at this time, the patient is most appropriate for outpatient management    Barriers to care at this time, including but not limited to:  None .     59-year-old presenting with above presentation.  No significant abnormalities other than her hyperglycemia.  Does not appear to be in DKA.  Patient was given a single dose of insulin  here without significant correction however will discharge home with ongoing sliding scale insulin use at home as she is used to.  Viral panel today is negative.  Troponin negative.  EKG as above reviewed and not significantly different than prior.  Do not believe that she will require inpatient ACS evaluation.  I have a low suspicion for acute subdiaphragmatic pathology and therefore will not escalate to advanced imaging of the abdomen pelvis.  Patient and family understanding of today's evaluation, findings as well as return precautions but also with outpatient follow-up by her PCP.        FINAL DIAGNOSIS  1. Viral syndrome    2. Hyperglycemia         Electronically signed by: Steve Espinosa M.D., 8/9/2024 1:21 PM